# Patient Record
Sex: MALE | Race: WHITE | NOT HISPANIC OR LATINO | Employment: FULL TIME | RURAL
[De-identification: names, ages, dates, MRNs, and addresses within clinical notes are randomized per-mention and may not be internally consistent; named-entity substitution may affect disease eponyms.]

---

## 2022-10-27 ENCOUNTER — OFFICE VISIT (OUTPATIENT)
Dept: CARDIOLOGY | Facility: CLINIC | Age: 37
End: 2022-10-27
Payer: COMMERCIAL

## 2022-10-27 VITALS
HEIGHT: 72 IN | DIASTOLIC BLOOD PRESSURE: 86 MMHG | BODY MASS INDEX: 34.71 KG/M2 | WEIGHT: 256.25 LBS | OXYGEN SATURATION: 95 % | SYSTOLIC BLOOD PRESSURE: 132 MMHG | HEART RATE: 81 BPM

## 2022-10-27 DIAGNOSIS — J30.2 SEASONAL ALLERGIES: ICD-10-CM

## 2022-10-27 DIAGNOSIS — R07.9 CHEST PAIN, UNSPECIFIED TYPE: Primary | ICD-10-CM

## 2022-10-27 DIAGNOSIS — G47.33 OBSTRUCTIVE SLEEP APNEA OF ADULT: ICD-10-CM

## 2022-10-27 DIAGNOSIS — R00.2 INTERMITTENT PALPITATIONS: ICD-10-CM

## 2022-10-27 PROCEDURE — 1159F PR MEDICATION LIST DOCUMENTED IN MEDICAL RECORD: ICD-10-PCS | Mod: ,,, | Performed by: INTERNAL MEDICINE

## 2022-10-27 PROCEDURE — 3075F PR MOST RECENT SYSTOLIC BLOOD PRESS GE 130-139MM HG: ICD-10-PCS | Mod: ,,, | Performed by: INTERNAL MEDICINE

## 2022-10-27 PROCEDURE — 99205 OFFICE O/P NEW HI 60 MIN: CPT | Mod: ,,, | Performed by: INTERNAL MEDICINE

## 2022-10-27 PROCEDURE — 3075F SYST BP GE 130 - 139MM HG: CPT | Mod: ,,, | Performed by: INTERNAL MEDICINE

## 2022-10-27 PROCEDURE — 1160F PR REVIEW ALL MEDS BY PRESCRIBER/CLIN PHARMACIST DOCUMENTED: ICD-10-PCS | Mod: ,,, | Performed by: INTERNAL MEDICINE

## 2022-10-27 PROCEDURE — 99205 PR OFFICE/OUTPT VISIT, NEW, LEVL V, 60-74 MIN: ICD-10-PCS | Mod: ,,, | Performed by: INTERNAL MEDICINE

## 2022-10-27 PROCEDURE — 3079F PR MOST RECENT DIASTOLIC BLOOD PRESSURE 80-89 MM HG: ICD-10-PCS | Mod: ,,, | Performed by: INTERNAL MEDICINE

## 2022-10-27 PROCEDURE — 1160F RVW MEDS BY RX/DR IN RCRD: CPT | Mod: ,,, | Performed by: INTERNAL MEDICINE

## 2022-10-27 PROCEDURE — 3079F DIAST BP 80-89 MM HG: CPT | Mod: ,,, | Performed by: INTERNAL MEDICINE

## 2022-10-27 PROCEDURE — 93242 EXT ECG>48HR<7D RECORDING: CPT | Performed by: INTERNAL MEDICINE

## 2022-10-27 PROCEDURE — 1159F MED LIST DOCD IN RCRD: CPT | Mod: ,,, | Performed by: INTERNAL MEDICINE

## 2022-10-27 RX ORDER — IBUPROFEN 600 MG/1
TABLET ORAL
COMMUNITY

## 2022-10-27 NOTE — PROGRESS NOTES
Cardiology Clinic Note:    PCP: Radha Dejesus DO        CHIEF COMPLAINT: chest pain    HISTORY OF PRESENT ILLNESS:  Geronimo Stephen is a 37 y.o. male who presents for evaluation of chest pain,  one episode occurred at rest, 8/10 at most severe, lasted approximately one hour, had EKG at [facility] department , was reportedly normal, waited out the pain, has not reoccured. Chest pain not associated with nausea, did have diphoresis and shortness of breath.  Pt is also experiencing episodes of heart racing and pounding intermittantly, comes and goes spontaneously, lasts seconds,, associated with feeling of chest heaviness.  No previous cardiac issues,        Review of Systems:  Review of Systems   Constitutional: Negative.   HENT:          Chronic sinusitus, controlled with OTC Allegra   Eyes: Negative.    Cardiovascular:  Positive for chest pain.   Respiratory: Negative.          LAITH, compliant with CPAP nightly   Endocrine: Negative.    Hematologic/Lymphatic: Negative.    Skin: Negative.    Musculoskeletal:  Positive for back pain.   Gastrointestinal: Negative.    Genitourinary: Negative.    Neurological:  Positive for numbness.        Left buttocks numbness intermittanly, known Lumbar disc disease at two levels   Psychiatric/Behavioral: Negative.     Allergic/Immunologic: Negative.         PAST MEDICAL HISTORY:  Past Medical History:   Diagnosis Date    Sleep apnea        PAST SURGICAL HISTORY:  Past Surgical History:   Procedure Laterality Date    SINUS SURGERY         SOCIAL HISTORY:  Social History     Socioeconomic History    Marital status:    Tobacco Use    Smoking status: Never    Smokeless tobacco: Never   Substance and Sexual Activity    Alcohol use: Yes     Comment: occasional    Drug use: Never       FAMILY HISTORY:  Family History   Problem Relation Age of Onset    Heart attack Father        ALLERGIES:  Allergies as of 10/27/2022    (No Known Allergies)         MEDICATIONS:  Current Outpatient  Medications on File Prior to Visit   Medication Sig Dispense Refill    ibuprofen (ADVIL,MOTRIN) 600 MG tablet ibuprofen   PRN       No current facility-administered medications on file prior to visit.          PHYSICAL EXAM:  Blood pressure 132/86, pulse 81, height 6' (1.829 m), weight 116.2 kg (256 lb 4 oz), SpO2 95 %.  Wt Readings from Last 3 Encounters:   10/27/22 116.2 kg (256 lb 4 oz)      Body mass index is 34.75 kg/m².    Physical Exam  Vitals and nursing note reviewed.   Constitutional:       Appearance: Normal appearance. He is normal weight.   HENT:      Head: Normocephalic and atraumatic.      Right Ear: External ear normal.      Left Ear: External ear normal.   Eyes:      General: No scleral icterus.        Right eye: No discharge.         Left eye: No discharge.      Extraocular Movements: Extraocular movements intact.      Conjunctiva/sclera: Conjunctivae normal.      Pupils: Pupils are equal, round, and reactive to light.   Cardiovascular:      Rate and Rhythm: Normal rate and regular rhythm.      Pulses: Normal pulses.      Heart sounds: Normal heart sounds. No murmur heard.    No friction rub. No gallop.   Pulmonary:      Effort: Pulmonary effort is normal.      Breath sounds: Normal breath sounds. No wheezing, rhonchi or rales.   Chest:      Chest wall: No tenderness.   Abdominal:      General: Abdomen is flat. Bowel sounds are normal. There is no distension.      Palpations: Abdomen is soft.      Tenderness: There is no abdominal tenderness. There is no guarding or rebound.   Musculoskeletal:         General: No swelling or tenderness. Normal range of motion.      Cervical back: Normal range of motion and neck supple.   Skin:     General: Skin is warm and dry.      Findings: No erythema or rash.   Neurological:      General: No focal deficit present.      Mental Status: He is alert and oriented to person, place, and time.      Cranial Nerves: No cranial nerve deficit.      Motor: No weakness.       Gait: Gait normal.   Psychiatric:         Mood and Affect: Mood normal.         Behavior: Behavior normal.         Thought Content: Thought content normal.         Judgment: Judgment normal.        LABS REVIEWED:  No results found for: WBC, RBC, HGB, HCT, MCV, MCH, MCHC, RDW, PLT, MPV, NRBC, INR  No results found for: NA, K, CL, CO2, BUN, MG, PHOS  No results found for: CPK, AST, ALT  No results found for: GLU, HGBA1C  No results found for: CHOL, HDL, LDL, TRIG, CHOLHDL    CARDIAC STUDIES REVIEWED:    OTHER IMAGING STUDIES REVIEWED:        ASSESSMENT: PLAN:  1. Chest pain, add ASA 81 mg q d, schedule treadmill stress test  2. Palpitations: will place on outpatient telemetry to eval for arrhythmia  3. LAITH: compliant with CPAP  4. Seasonal allergies, controlled with OTC Allegra

## 2023-05-24 ENCOUNTER — HOSPITAL ENCOUNTER (OUTPATIENT)
Dept: RADIOLOGY | Facility: HOSPITAL | Age: 38
Discharge: HOME | End: 2023-05-24
Attending: ORTHOPAEDIC SURGERY
Payer: COMMERCIAL

## 2023-05-24 ENCOUNTER — OFFICE VISIT (OUTPATIENT)
Dept: ORTHOPEDICS | Facility: CLINIC | Age: 38
End: 2023-05-24
Payer: COMMERCIAL

## 2023-05-24 DIAGNOSIS — M79.641 HAND PAIN, RIGHT: Primary | ICD-10-CM

## 2023-05-24 DIAGNOSIS — S62.346A CLOSED NONDISPLACED FRACTURE OF BASE OF FIFTH METACARPAL BONE OF RIGHT HAND, INITIAL ENCOUNTER: Primary | ICD-10-CM

## 2023-05-24 DIAGNOSIS — M79.641 HAND PAIN, RIGHT: ICD-10-CM

## 2023-05-24 DIAGNOSIS — S62.306A CLOSED FRACTURE OF FIFTH METACARPAL BONE OF RIGHT HAND: Primary | ICD-10-CM

## 2023-05-24 DIAGNOSIS — S62.306A CLOSED FRACTURE OF FIFTH METACARPAL BONE OF RIGHT HAND: ICD-10-CM

## 2023-05-24 PROCEDURE — 99204 OFFICE O/P NEW MOD 45 MIN: CPT | Mod: S$PBB,57,, | Performed by: ORTHOPAEDIC SURGERY

## 2023-05-24 PROCEDURE — 26605 TREAT METACARPAL FRACTURE: CPT | Mod: S$PBB,RT,, | Performed by: ORTHOPAEDIC SURGERY

## 2023-05-24 PROCEDURE — 99213 OFFICE O/P EST LOW 20 MIN: CPT | Mod: PBBFAC | Performed by: ORTHOPAEDIC SURGERY

## 2023-05-24 PROCEDURE — 26605 TREAT METACARPAL FRACTURE: CPT | Mod: PBBFAC | Performed by: ORTHOPAEDIC SURGERY

## 2023-05-24 PROCEDURE — 1159F PR MEDICATION LIST DOCUMENTED IN MEDICAL RECORD: ICD-10-PCS | Mod: CPTII,,, | Performed by: ORTHOPAEDIC SURGERY

## 2023-05-24 PROCEDURE — 1160F RVW MEDS BY RX/DR IN RCRD: CPT | Mod: CPTII,,, | Performed by: ORTHOPAEDIC SURGERY

## 2023-05-24 PROCEDURE — 1159F MED LIST DOCD IN RCRD: CPT | Mod: CPTII,,, | Performed by: ORTHOPAEDIC SURGERY

## 2023-05-24 PROCEDURE — 26605 PR CLOSED RX METACARPAL FX,MANIP: ICD-10-PCS | Mod: S$PBB,RT,, | Performed by: ORTHOPAEDIC SURGERY

## 2023-05-24 PROCEDURE — 99204 PR OFFICE/OUTPT VISIT, NEW, LEVL IV, 45-59 MIN: ICD-10-PCS | Mod: S$PBB,57,, | Performed by: ORTHOPAEDIC SURGERY

## 2023-05-24 PROCEDURE — 1160F PR REVIEW ALL MEDS BY PRESCRIBER/CLIN PHARMACIST DOCUMENTED: ICD-10-PCS | Mod: CPTII,,, | Performed by: ORTHOPAEDIC SURGERY

## 2023-05-24 NOTE — PROGRESS NOTES
CLINIC NOTE       Chief Complaint   Patient presents with    Right Hand - Pain        Geronimo Stephen is a 38 y.o. male seen today for evaluation of right hand injury.  Reportedly injured his dominant right hand 2 days ago.  He was attempting to break loose a seized up nut on a bolt.  He was using a [employer] bar.  When the note gave way in his right hand and fist went forward striking an object.  He had subsequent pain and swelling right hand region.  He was seen at [facility].  X-rays there suggested a nondisplaced fracture of the base of the 5th metacarpal.  He was referred for orthopedic consultation.  He is employed as a  and is right-hand dominant.    I have reviewed the x-rays of the right hand 03/23/2023 shows the bones well mineralized.  Carpus normally aligned.  There is a transverse extra-articular nondisplaced fracture of the base of the 5th metacarpal.  Past Medical History:   Diagnosis Date    Sleep apnea      Family History   Problem Relation Age of Onset    Heart attack Father      Current Outpatient Medications on File Prior to Visit   Medication Sig Dispense Refill    ibuprofen (ADVIL,MOTRIN) 600 MG tablet ibuprofen   PRN       No current facility-administered medications on file prior to visit.       ROS     There were no vitals filed for this visit.    Past Surgical History:   Procedure Laterality Date    SINUS SURGERY          Review of patient's allergies indicates:  No Known Allergies     Ortho Exam :  Well-developed well-nourished  male no acute distress.  Alert oriented cooperative.  Neck is supple without JVD.  Breathing is regular nonlabored.  Skin is warm and dry no lesions seen.  Exam of the right hand shows moderate soft tissue swelling and marked tenderness palpation of the base of the 5th metatarsal region.  Skin is intact.  Motor and sensory function intact right hand.    Radiographic Examination:    Technique:    Findings:    Impression:   See  Above    Assessment and Plan  Patient Active Problem List    Diagnosis Date Noted    Chest pain 10/27/2022    Intermittent palpitations 10/27/2022    Obstructive sleep apnea of adult 10/27/2022    Seasonal allergies 10/27/2022    Impression:  Closed nondisplaced fracture right 5th metacarpal   Plan:  1. Velcro wrist splint applied 2.  Activity modifications outlined.  Patient indicates he is going to be allowed to participate in restricted duties at work.  We will see him back 2 weeks' time repeat x-ray or sooner for interval problems.      Reymundo Garcia M.D.

## 2023-06-15 DIAGNOSIS — Z47.89 ORTHOPEDIC AFTERCARE: Primary | ICD-10-CM

## 2023-06-19 ENCOUNTER — OFFICE VISIT (OUTPATIENT)
Dept: ORTHOPEDICS | Facility: CLINIC | Age: 38
End: 2023-06-19
Payer: COMMERCIAL

## 2023-06-19 ENCOUNTER — HOSPITAL ENCOUNTER (OUTPATIENT)
Dept: RADIOLOGY | Facility: HOSPITAL | Age: 38
Discharge: HOME | End: 2023-06-19
Attending: ORTHOPAEDIC SURGERY
Payer: COMMERCIAL

## 2023-06-19 VITALS
BODY MASS INDEX: 34.67 KG/M2 | DIASTOLIC BLOOD PRESSURE: 78 MMHG | TEMPERATURE: 98 F | OXYGEN SATURATION: 99 % | RESPIRATION RATE: 20 BRPM | HEIGHT: 72 IN | SYSTOLIC BLOOD PRESSURE: 112 MMHG | WEIGHT: 256 LBS | HEART RATE: 68 BPM

## 2023-06-19 DIAGNOSIS — Z47.89 ORTHOPEDIC AFTERCARE: ICD-10-CM

## 2023-06-19 DIAGNOSIS — S62.346D CLOSED NONDISPLACED FRACTURE OF BASE OF FIFTH METACARPAL BONE OF RIGHT HAND WITH ROUTINE HEALING, SUBSEQUENT ENCOUNTER: Primary | ICD-10-CM

## 2023-06-19 PROCEDURE — 99024 POSTOP FOLLOW-UP VISIT: CPT | Mod: ,,, | Performed by: ORTHOPAEDIC SURGERY

## 2023-06-19 PROCEDURE — 3008F PR BODY MASS INDEX (BMI) DOCUMENTED: ICD-10-PCS | Mod: CPTII,,, | Performed by: ORTHOPAEDIC SURGERY

## 2023-06-19 PROCEDURE — 3008F BODY MASS INDEX DOCD: CPT | Mod: CPTII,,, | Performed by: ORTHOPAEDIC SURGERY

## 2023-06-19 PROCEDURE — 3078F PR MOST RECENT DIASTOLIC BLOOD PRESSURE < 80 MM HG: ICD-10-PCS | Mod: CPTII,,, | Performed by: ORTHOPAEDIC SURGERY

## 2023-06-19 PROCEDURE — 99214 OFFICE O/P EST MOD 30 MIN: CPT | Mod: PBBFAC | Performed by: ORTHOPAEDIC SURGERY

## 2023-06-19 PROCEDURE — 3078F DIAST BP <80 MM HG: CPT | Mod: CPTII,,, | Performed by: ORTHOPAEDIC SURGERY

## 2023-06-19 PROCEDURE — 3074F SYST BP LT 130 MM HG: CPT | Mod: CPTII,,, | Performed by: ORTHOPAEDIC SURGERY

## 2023-06-19 PROCEDURE — 3074F PR MOST RECENT SYSTOLIC BLOOD PRESSURE < 130 MM HG: ICD-10-PCS | Mod: CPTII,,, | Performed by: ORTHOPAEDIC SURGERY

## 2023-06-19 PROCEDURE — 1160F PR REVIEW ALL MEDS BY PRESCRIBER/CLIN PHARMACIST DOCUMENTED: ICD-10-PCS | Mod: CPTII,,, | Performed by: ORTHOPAEDIC SURGERY

## 2023-06-19 PROCEDURE — 1159F PR MEDICATION LIST DOCUMENTED IN MEDICAL RECORD: ICD-10-PCS | Mod: CPTII,,, | Performed by: ORTHOPAEDIC SURGERY

## 2023-06-19 PROCEDURE — 99024 PR POST-OP FOLLOW-UP VISIT: ICD-10-PCS | Mod: ,,, | Performed by: ORTHOPAEDIC SURGERY

## 2023-06-19 PROCEDURE — 1160F RVW MEDS BY RX/DR IN RCRD: CPT | Mod: CPTII,,, | Performed by: ORTHOPAEDIC SURGERY

## 2023-06-19 PROCEDURE — 73130 X-RAY EXAM OF HAND: CPT | Mod: TC,RT

## 2023-06-19 PROCEDURE — 73130 X-RAY EXAM OF HAND: CPT | Mod: 26,RT,, | Performed by: ORTHOPAEDIC SURGERY

## 2023-06-19 PROCEDURE — 73130 XR HAND COMPLETE 3 VIEW RIGHT: ICD-10-PCS | Mod: 26,RT,, | Performed by: ORTHOPAEDIC SURGERY

## 2023-06-19 PROCEDURE — 1159F MED LIST DOCD IN RCRD: CPT | Mod: CPTII,,, | Performed by: ORTHOPAEDIC SURGERY

## 2023-06-19 NOTE — PROGRESS NOTES
CLINIC NOTE       Chief Complaint   Patient presents with    Right Hand - Pain        Geronimo Stephen is a 38 y.o. male seen today for recheck of his right hand.  He is now 4 weeks following closed treatment fracture involving extra-articular base of the 5th metacarpal.  She is doing well at this time.  Pain is diminished.  He is employed as a  and has been receiving accommodation/modified duties.  He has a Velcro wrist splint in place.    Past Medical History:   Diagnosis Date    Sleep apnea      Family History   Problem Relation Age of Onset    Heart attack Father      Current Outpatient Medications on File Prior to Visit   Medication Sig Dispense Refill    ibuprofen (ADVIL,MOTRIN) 600 MG tablet ibuprofen   PRN       No current facility-administered medications on file prior to visit.       ROS     Vitals:    06/19/23 1142   BP: 112/78   Pulse: 68   Resp: 20   Temp: 98.4 °F (36.9 °C)       Past Surgical History:   Procedure Laterality Date    SINUS SURGERY          Review of patient's allergies indicates:  No Known Allergies     Ortho Exam right hand shows mild soft tissue swelling/enlargement involving the base of the 5th metacarpal.  Skin is intact.  He has mild stiffness of the MP joint lacking 10° of full flexion.    Radiographic Examination:  Right hand 06/19/2023    Technique:  Three views AP lateral oblique    Findings:  Bones well mineralized.  Carpus normally aligned.  There is a transverse fracture of the 5th metacarpal base with mild volar angulation of the distal segment.  There is radiographic evidence of progressive healing.    Impression:   See Above    Assessment and Plan  Patient Active Problem List    Diagnosis Date Noted    Closed nondisplaced fracture of base of fifth metacarpal bone of right hand 05/24/2023    Chest pain 10/27/2022    Intermittent palpitations 10/27/2022    Obstructive sleep apnea of adult 10/27/2022    Seasonal allergies 10/27/2022    Impression:  Healing  fracture 5th metacarpal-right hand   Plan:  May discontinue use of the brace for routine activities.  Continue brace for any significant lifting.  She is to work on range motion exercises of the 5th MCP joint in flexion.  Recheck with anticipated final x-ray 2 weeks.      Reymundo Garcia M.D.

## 2023-07-06 DIAGNOSIS — Z47.89 ORTHOPEDIC AFTERCARE: Primary | ICD-10-CM

## 2023-07-07 ENCOUNTER — HOSPITAL ENCOUNTER (OUTPATIENT)
Dept: RADIOLOGY | Facility: HOSPITAL | Age: 38
Discharge: HOME | End: 2023-07-07
Attending: ORTHOPAEDIC SURGERY
Payer: COMMERCIAL

## 2023-07-07 ENCOUNTER — OFFICE VISIT (OUTPATIENT)
Dept: ORTHOPEDICS | Facility: CLINIC | Age: 38
End: 2023-07-07
Payer: COMMERCIAL

## 2023-07-07 DIAGNOSIS — Z47.89 ORTHOPEDIC AFTERCARE: ICD-10-CM

## 2023-07-07 DIAGNOSIS — M84.441D PATHOLOGIC METACARPAL FRACTURE, RIGHT, WITH ROUTINE HEALING, SUBSEQUENT ENCOUNTER: Primary | ICD-10-CM

## 2023-07-07 PROCEDURE — 1159F MED LIST DOCD IN RCRD: CPT | Mod: CPTII,,, | Performed by: ORTHOPAEDIC SURGERY

## 2023-07-07 PROCEDURE — 99024 PR POST-OP FOLLOW-UP VISIT: ICD-10-PCS | Mod: ,,, | Performed by: ORTHOPAEDIC SURGERY

## 2023-07-07 PROCEDURE — 73130 X-RAY EXAM OF HAND: CPT | Mod: 26,RT,, | Performed by: ORTHOPAEDIC SURGERY

## 2023-07-07 PROCEDURE — 73130 X-RAY EXAM OF HAND: CPT | Mod: TC,RT

## 2023-07-07 PROCEDURE — 1159F PR MEDICATION LIST DOCUMENTED IN MEDICAL RECORD: ICD-10-PCS | Mod: CPTII,,, | Performed by: ORTHOPAEDIC SURGERY

## 2023-07-07 PROCEDURE — 99213 OFFICE O/P EST LOW 20 MIN: CPT | Mod: PBBFAC | Performed by: ORTHOPAEDIC SURGERY

## 2023-07-07 PROCEDURE — 99024 POSTOP FOLLOW-UP VISIT: CPT | Mod: ,,, | Performed by: ORTHOPAEDIC SURGERY

## 2023-07-07 PROCEDURE — 73130 XR HAND COMPLETE 3 VIEW RIGHT: ICD-10-PCS | Mod: 26,RT,, | Performed by: ORTHOPAEDIC SURGERY

## 2023-07-07 PROCEDURE — 1160F PR REVIEW ALL MEDS BY PRESCRIBER/CLIN PHARMACIST DOCUMENTED: ICD-10-PCS | Mod: CPTII,,, | Performed by: ORTHOPAEDIC SURGERY

## 2023-07-07 PROCEDURE — 1160F RVW MEDS BY RX/DR IN RCRD: CPT | Mod: CPTII,,, | Performed by: ORTHOPAEDIC SURGERY

## 2023-07-07 NOTE — PROGRESS NOTES
CLINIC NOTE       Chief Complaint   Patient presents with    Right Hand - Follow-up        Geronimo Stephen is a 38 y.o. male seen today for recheck of his right hand.  He underwent closed treatment for a mildly displaced extra-articular fracture of the base of the 5th metacarpal.  He is employed as a .  Pain at this point is minimal.  Does have a sense of soreness with repetitive views.    Past Medical History:   Diagnosis Date    Sleep apnea      Family History   Problem Relation Age of Onset    Heart attack Father      Current Outpatient Medications on File Prior to Visit   Medication Sig Dispense Refill    ibuprofen (ADVIL,MOTRIN) 600 MG tablet ibuprofen   PRN       No current facility-administered medications on file prior to visit.       ROS     There were no vitals filed for this visit.    Past Surgical History:   Procedure Laterality Date    SINUS SURGERY          Review of patient's allergies indicates:  No Known Allergies     Ortho Exam right hand is normal contour.  He is regained full MCP and IP joint motion to the little finger.  There is no rotatory deformity.  Fracture stable clinically    Radiographic Examination:  Right hand 07/07/2023    Technique:  Right hand three views AP lateral oblique  Findings:  Bones well mineralized.  Transverse fracture of the diametaphyseal base of the 5th metacarpal appears healed radiographically.  There is mild dorsal displacement of the shaft segment.  Impression:   See Above    Assessment and Plan  Patient Active Problem List    Diagnosis Date Noted    Closed nondisplaced fracture of base of fifth metacarpal bone of right hand with routine healing 05/24/2023    Chest pain 10/27/2022    Intermittent palpitations 10/27/2022    Obstructive sleep apnea of adult 10/27/2022    Seasonal allergies 10/27/2022    Impression:  Healed right 5th metacarpal fracture   Plan:  Slow progression of activities outlined.  He was released to activities as tolerated and  I returned a call to the pt and informed that as of now testing is not necessary as she does not have any symptoms and she verbalized understanding. //kah   released from work restrictions.      Reymundo Garcia M.D.

## 2025-04-03 ENCOUNTER — TELEPHONE (OUTPATIENT)
Dept: PAIN MEDICINE | Facility: CLINIC | Age: 40
End: 2025-04-03
Payer: COMMERCIAL

## 2025-04-03 NOTE — TELEPHONE ENCOUNTER
Attempted to call patient back. No answer but left voicemail. I let patient know that yes Dr. Hammond does do RFTC to SI joint. First they have to have 2 injections with good relief and then she can burn the nerve.    ----- Message from Tech L[place]ina sent at 4/1/2025  4:44 PM CDT -----  Who Called: Geronimo Higgins is requesting assistance/information from provider's office.Preferred Method of Contact: Phone CallPatient's Preferred Phone Number on File: [phone] Best Call Back Number, if different:Additional Information: Patient wanted to know if Dr Hammond did a RF Ablation on the sacra iliac joint/ Patient would like a call back to discuss this.